# Patient Record
Sex: FEMALE | Race: WHITE | NOT HISPANIC OR LATINO | Employment: FULL TIME | URBAN - METROPOLITAN AREA
[De-identification: names, ages, dates, MRNs, and addresses within clinical notes are randomized per-mention and may not be internally consistent; named-entity substitution may affect disease eponyms.]

---

## 2024-01-10 ENCOUNTER — LAB (OUTPATIENT)
Dept: LAB | Facility: HOSPITAL | Age: 29
End: 2024-01-10
Payer: COMMERCIAL

## 2024-01-10 ENCOUNTER — TRANSCRIBE ORDERS (OUTPATIENT)
Dept: LAB | Facility: HOSPITAL | Age: 29
End: 2024-01-10
Payer: COMMERCIAL

## 2024-01-10 DIAGNOSIS — Z3A.21 21 WEEKS GESTATION OF PREGNANCY: Primary | ICD-10-CM

## 2024-01-10 DIAGNOSIS — Z3A.21 21 WEEKS GESTATION OF PREGNANCY: ICD-10-CM

## 2024-01-10 LAB
BLD GP AB SCN SERPL QL: NEGATIVE
DEPRECATED RDW RBC AUTO: 40.5 FL (ref 37–54)
ERYTHROCYTE [DISTWIDTH] IN BLOOD BY AUTOMATED COUNT: 12.4 % (ref 12.3–15.4)
EXTERNAL CHLAMYDIA SCREEN: NEGATIVE
EXTERNAL GONORRHEA SCREEN: NEGATIVE
EXTERNAL GTT 1 HOUR: 123
EXTERNAL GTT 1 HOUR: 123
EXTERNAL HEPATITIS B SURFACE ANTIGEN: NEGATIVE
EXTERNAL HEPATITIS C AB: NORMAL
EXTERNAL RUBELLA QUALITATIVE: NORMAL
EXTERNAL RUBELLA QUALITATIVE: NORMAL
EXTERNAL SYPHILIS ANTIBODY: NORMAL
EXTERNAL URINE DRUG SCREEN: NORMAL
EXTERNAL URINE DRUG SCREEN: NORMAL
GLUCOSE 1H P 100 G GLC PO SERPL-MCNC: 123 MG/DL (ref 65–139)
HCT VFR BLD AUTO: 35.3 % (ref 34–46.6)
HGB BLD-MCNC: 11.6 G/DL (ref 12–15.9)
HIV1 P24 AG SERPL QL IA: NORMAL
MCH RBC QN AUTO: 29.2 PG (ref 26.6–33)
MCHC RBC AUTO-ENTMCNC: 32.9 G/DL (ref 31.5–35.7)
MCV RBC AUTO: 88.9 FL (ref 79–97)
PLATELET # BLD AUTO: 259 10*3/MM3 (ref 140–450)
PMV BLD AUTO: 11.7 FL (ref 6–12)
RBC # BLD AUTO: 3.97 10*6/MM3 (ref 3.77–5.28)
WBC NRBC COR # BLD AUTO: 13.81 10*3/MM3 (ref 3.4–10.8)

## 2024-01-10 PROCEDURE — 85027 COMPLETE CBC AUTOMATED: CPT

## 2024-01-10 PROCEDURE — 36415 COLL VENOUS BLD VENIPUNCTURE: CPT

## 2024-01-10 PROCEDURE — 86850 RBC ANTIBODY SCREEN: CPT

## 2024-01-10 PROCEDURE — 82948 REAGENT STRIP/BLOOD GLUCOSE: CPT | Performed by: OBSTETRICS & GYNECOLOGY

## 2024-01-10 PROCEDURE — 82950 GLUCOSE TEST: CPT

## 2024-03-30 LAB — EXTERNAL GROUP B STREP ANTIGEN: NEGATIVE

## 2024-04-09 ENCOUNTER — HOSPITAL ENCOUNTER (INPATIENT)
Facility: HOSPITAL | Age: 29
LOS: 3 days | Discharge: HOME | End: 2024-04-12
Attending: OBSTETRICS & GYNECOLOGY | Admitting: OBSTETRICS & GYNECOLOGY
Payer: COMMERCIAL

## 2024-04-09 ENCOUNTER — ANESTHESIA (OUTPATIENT)
Dept: LABOR AND DELIVERY | Facility: HOSPITAL | Age: 29
End: 2024-04-09
Payer: COMMERCIAL

## 2024-04-09 ENCOUNTER — ANESTHESIA EVENT (OUTPATIENT)
Dept: LABOR AND DELIVERY | Facility: HOSPITAL | Age: 29
End: 2024-04-09
Payer: COMMERCIAL

## 2024-04-09 PROBLEM — Z37.9 NORMAL LABOR: Status: ACTIVE | Noted: 2024-04-09

## 2024-04-09 LAB
ABO GROUP BLD: NORMAL
ALP SERPL-CCNC: 149 U/L (ref 39–117)
ALT SERPL W P-5'-P-CCNC: 7 U/L (ref 1–33)
AST SERPL-CCNC: 23 U/L (ref 1–32)
BILIRUB SERPL-MCNC: 0.4 MG/DL (ref 0–1.2)
BLD GP AB SCN SERPL QL: NEGATIVE
CREAT SERPL-MCNC: 0.67 MG/DL (ref 0.57–1)
DEPRECATED RDW RBC AUTO: 43.6 FL (ref 37–54)
ERYTHROCYTE [DISTWIDTH] IN BLOOD BY AUTOMATED COUNT: 13.7 % (ref 12.3–15.4)
HCT VFR BLD AUTO: 37.2 % (ref 34–46.6)
HGB BLD-MCNC: 12.8 G/DL (ref 12–15.9)
LDH SERPL-CCNC: 184 U/L (ref 135–214)
MCH RBC QN AUTO: 29.6 PG (ref 26.6–33)
MCHC RBC AUTO-ENTMCNC: 34.4 G/DL (ref 31.5–35.7)
MCV RBC AUTO: 86.1 FL (ref 79–97)
PLATELET # BLD AUTO: 252 10*3/MM3 (ref 140–450)
PMV BLD AUTO: 11.8 FL (ref 6–12)
RBC # BLD AUTO: 4.32 10*6/MM3 (ref 3.77–5.28)
RH BLD: POSITIVE
T PALLIDUM IGG SER QL: NORMAL
T&S EXPIRATION DATE: NORMAL
URATE SERPL-MCNC: 5 MG/DL (ref 2.4–5.7)
WBC NRBC COR # BLD AUTO: 15 10*3/MM3 (ref 3.4–10.8)

## 2024-04-09 PROCEDURE — 86780 TREPONEMA PALLIDUM: CPT | Performed by: OBSTETRICS & GYNECOLOGY

## 2024-04-09 PROCEDURE — 84460 ALANINE AMINO (ALT) (SGPT): CPT | Performed by: OBSTETRICS & GYNECOLOGY

## 2024-04-09 PROCEDURE — 84075 ASSAY ALKALINE PHOSPHATASE: CPT | Performed by: OBSTETRICS & GYNECOLOGY

## 2024-04-09 PROCEDURE — 59025 FETAL NON-STRESS TEST: CPT

## 2024-04-09 PROCEDURE — 25010000002 FENTANYL CITRATE (PF) 50 MCG/ML SOLUTION: Performed by: ANESTHESIOLOGY

## 2024-04-09 PROCEDURE — 25010000002 ROPIVACAINE PER 1 MG: Performed by: ANESTHESIOLOGY

## 2024-04-09 PROCEDURE — 86901 BLOOD TYPING SEROLOGIC RH(D): CPT | Performed by: OBSTETRICS & GYNECOLOGY

## 2024-04-09 PROCEDURE — C1755 CATHETER, INTRASPINAL: HCPCS | Performed by: ANESTHESIOLOGY

## 2024-04-09 PROCEDURE — 25810000003 LACTATED RINGERS PER 1000 ML: Performed by: OBSTETRICS & GYNECOLOGY

## 2024-04-09 PROCEDURE — 82565 ASSAY OF CREATININE: CPT | Performed by: OBSTETRICS & GYNECOLOGY

## 2024-04-09 PROCEDURE — 25010000002 ONDANSETRON PER 1 MG: Performed by: OBSTETRICS & GYNECOLOGY

## 2024-04-09 PROCEDURE — 25010000002 BUPIVACAINE (PF) 0.5 % SOLUTION: Performed by: ANESTHESIOLOGY

## 2024-04-09 PROCEDURE — C1755 CATHETER, INTRASPINAL: HCPCS

## 2024-04-09 PROCEDURE — 84450 TRANSFERASE (AST) (SGOT): CPT | Performed by: OBSTETRICS & GYNECOLOGY

## 2024-04-09 PROCEDURE — 85027 COMPLETE CBC AUTOMATED: CPT | Performed by: OBSTETRICS & GYNECOLOGY

## 2024-04-09 PROCEDURE — 25810000003 LACTATED RINGERS SOLUTION: Performed by: OBSTETRICS & GYNECOLOGY

## 2024-04-09 PROCEDURE — 86850 RBC ANTIBODY SCREEN: CPT | Performed by: OBSTETRICS & GYNECOLOGY

## 2024-04-09 PROCEDURE — 83615 LACTATE (LD) (LDH) ENZYME: CPT | Performed by: OBSTETRICS & GYNECOLOGY

## 2024-04-09 PROCEDURE — 82247 BILIRUBIN TOTAL: CPT | Performed by: OBSTETRICS & GYNECOLOGY

## 2024-04-09 PROCEDURE — 51702 INSERT TEMP BLADDER CATH: CPT

## 2024-04-09 PROCEDURE — 84550 ASSAY OF BLOOD/URIC ACID: CPT | Performed by: OBSTETRICS & GYNECOLOGY

## 2024-04-09 PROCEDURE — 86900 BLOOD TYPING SEROLOGIC ABO: CPT | Performed by: OBSTETRICS & GYNECOLOGY

## 2024-04-09 RX ORDER — SODIUM CHLORIDE, SODIUM LACTATE, POTASSIUM CHLORIDE, CALCIUM CHLORIDE 600; 310; 30; 20 MG/100ML; MG/100ML; MG/100ML; MG/100ML
125 INJECTION, SOLUTION INTRAVENOUS CONTINUOUS
Status: DISCONTINUED | OUTPATIENT
Start: 2024-04-09 | End: 2024-04-12 | Stop reason: HOSPADM

## 2024-04-09 RX ORDER — ACETAMINOPHEN 325 MG/1
650 TABLET ORAL EVERY 4 HOURS PRN
Status: DISCONTINUED | OUTPATIENT
Start: 2024-04-09 | End: 2024-04-12 | Stop reason: HOSPADM

## 2024-04-09 RX ORDER — OXYTOCIN/0.9 % SODIUM CHLORIDE 30/500 ML
999 PLASTIC BAG, INJECTION (ML) INTRAVENOUS ONCE
Status: DISCONTINUED | OUTPATIENT
Start: 2024-04-09 | End: 2024-04-12 | Stop reason: HOSPADM

## 2024-04-09 RX ORDER — PRENATAL WITH FERROUS FUM AND FOLIC ACID 3080; 920; 120; 400; 22; 1.84; 3; 20; 10; 1; 12; 200; 27; 25; 2 [IU]/1; [IU]/1; MG/1; [IU]/1; MG/1; MG/1; MG/1; MG/1; MG/1; MG/1; UG/1; MG/1; MG/1; MG/1; MG/1
TABLET ORAL DAILY
COMMUNITY

## 2024-04-09 RX ORDER — SODIUM CHLORIDE 9 MG/ML
40 INJECTION, SOLUTION INTRAVENOUS AS NEEDED
Status: DISCONTINUED | OUTPATIENT
Start: 2024-04-09 | End: 2024-04-12 | Stop reason: HOSPADM

## 2024-04-09 RX ORDER — L.ACID,CASEI/B.ANIMAL/S.THERMO 16B CELL
1 CAPSULE ORAL DAILY
COMMUNITY

## 2024-04-09 RX ORDER — LIDOCAINE HYDROCHLORIDE 10 MG/ML
0.5 INJECTION, SOLUTION EPIDURAL; INFILTRATION; INTRACAUDAL; PERINEURAL ONCE AS NEEDED
Status: DISCONTINUED | OUTPATIENT
Start: 2024-04-09 | End: 2024-04-12 | Stop reason: HOSPADM

## 2024-04-09 RX ORDER — SODIUM CHLORIDE 0.9 % (FLUSH) 0.9 %
10 SYRINGE (ML) INJECTION AS NEEDED
Status: DISCONTINUED | OUTPATIENT
Start: 2024-04-09 | End: 2024-04-12 | Stop reason: HOSPADM

## 2024-04-09 RX ORDER — ROPIVACAINE HYDROCHLORIDE 2 MG/ML
15 INJECTION, SOLUTION EPIDURAL; INFILTRATION; PERINEURAL CONTINUOUS
Status: DISCONTINUED | OUTPATIENT
Start: 2024-04-10 | End: 2024-04-12 | Stop reason: HOSPADM

## 2024-04-09 RX ORDER — ONDANSETRON 2 MG/ML
4 INJECTION INTRAMUSCULAR; INTRAVENOUS ONCE AS NEEDED
Status: DISCONTINUED | OUTPATIENT
Start: 2024-04-09 | End: 2024-04-10

## 2024-04-09 RX ORDER — EPHEDRINE SULFATE 5 MG/ML
10 INJECTION INTRAVENOUS
Status: DISCONTINUED | OUTPATIENT
Start: 2024-04-09 | End: 2024-04-10 | Stop reason: HOSPADM

## 2024-04-09 RX ORDER — CITRIC ACID/SODIUM CITRATE 334-500MG
30 SOLUTION, ORAL ORAL ONCE
Status: DISCONTINUED | OUTPATIENT
Start: 2024-04-10 | End: 2024-04-10 | Stop reason: HOSPADM

## 2024-04-09 RX ORDER — METOCLOPRAMIDE HYDROCHLORIDE 5 MG/ML
10 INJECTION INTRAMUSCULAR; INTRAVENOUS ONCE AS NEEDED
Status: DISCONTINUED | OUTPATIENT
Start: 2024-04-09 | End: 2024-04-10 | Stop reason: HOSPADM

## 2024-04-09 RX ORDER — OXYTOCIN/0.9 % SODIUM CHLORIDE 30/500 ML
250 PLASTIC BAG, INJECTION (ML) INTRAVENOUS CONTINUOUS
Status: ACTIVE | OUTPATIENT
Start: 2024-04-09 | End: 2024-04-09

## 2024-04-09 RX ORDER — CARBOPROST TROMETHAMINE 250 UG/ML
250 INJECTION, SOLUTION INTRAMUSCULAR
Status: DISCONTINUED | OUTPATIENT
Start: 2024-04-09 | End: 2024-04-12 | Stop reason: HOSPADM

## 2024-04-09 RX ORDER — ONDANSETRON 2 MG/ML
4 INJECTION INTRAMUSCULAR; INTRAVENOUS EVERY 6 HOURS PRN
Status: DISCONTINUED | OUTPATIENT
Start: 2024-04-09 | End: 2024-04-10

## 2024-04-09 RX ORDER — DIPHENHYDRAMINE HYDROCHLORIDE 50 MG/ML
12.5 INJECTION INTRAMUSCULAR; INTRAVENOUS EVERY 8 HOURS PRN
Status: DISCONTINUED | OUTPATIENT
Start: 2024-04-09 | End: 2024-04-10 | Stop reason: HOSPADM

## 2024-04-09 RX ORDER — SODIUM CHLORIDE 0.9 % (FLUSH) 0.9 %
10 SYRINGE (ML) INJECTION EVERY 12 HOURS SCHEDULED
Status: DISCONTINUED | OUTPATIENT
Start: 2024-04-09 | End: 2024-04-12 | Stop reason: HOSPADM

## 2024-04-09 RX ORDER — METHYLERGONOVINE MALEATE 0.2 MG/ML
200 INJECTION INTRAVENOUS ONCE AS NEEDED
Status: DISCONTINUED | OUTPATIENT
Start: 2024-04-09 | End: 2024-04-12 | Stop reason: HOSPADM

## 2024-04-09 RX ORDER — MAGNESIUM CARB/ALUMINUM HYDROX 105-160MG
30 TABLET,CHEWABLE ORAL ONCE
Status: DISCONTINUED | OUTPATIENT
Start: 2024-04-09 | End: 2024-04-12 | Stop reason: HOSPADM

## 2024-04-09 RX ORDER — MISOPROSTOL 200 UG/1
800 TABLET ORAL ONCE AS NEEDED
Status: DISCONTINUED | OUTPATIENT
Start: 2024-04-09 | End: 2024-04-12 | Stop reason: HOSPADM

## 2024-04-09 RX ORDER — ONDANSETRON 4 MG/1
4 TABLET, ORALLY DISINTEGRATING ORAL EVERY 6 HOURS PRN
Status: DISCONTINUED | OUTPATIENT
Start: 2024-04-09 | End: 2024-04-10

## 2024-04-09 RX ORDER — FAMOTIDINE 10 MG/ML
20 INJECTION, SOLUTION INTRAVENOUS ONCE AS NEEDED
Status: DISCONTINUED | OUTPATIENT
Start: 2024-04-09 | End: 2024-04-10 | Stop reason: HOSPADM

## 2024-04-09 RX ADMIN — EPHEDRINE SULFATE 10 MG: 5 INJECTION INTRAVENOUS at 23:54

## 2024-04-09 RX ADMIN — ONDANSETRON 4 MG: 2 INJECTION INTRAMUSCULAR; INTRAVENOUS at 21:39

## 2024-04-09 RX ADMIN — SODIUM CHLORIDE, POTASSIUM CHLORIDE, SODIUM LACTATE AND CALCIUM CHLORIDE 125 ML/HR: 600; 310; 30; 20 INJECTION, SOLUTION INTRAVENOUS at 11:15

## 2024-04-09 RX ADMIN — SODIUM CHLORIDE, POTASSIUM CHLORIDE, SODIUM LACTATE AND CALCIUM CHLORIDE 1000 ML: 600; 310; 30; 20 INJECTION, SOLUTION INTRAVENOUS at 22:52

## 2024-04-09 RX ADMIN — FENTANYL CITRATE 100 MCG: 50 INJECTION, SOLUTION INTRAMUSCULAR; INTRAVENOUS at 23:24

## 2024-04-09 RX ADMIN — BUPIVACAINE HYDROCHLORIDE 6 ML: 5 INJECTION, SOLUTION EPIDURAL; INTRACAUDAL at 23:24

## 2024-04-09 RX ADMIN — SODIUM CHLORIDE, POTASSIUM CHLORIDE, SODIUM LACTATE AND CALCIUM CHLORIDE 125 ML/HR: 600; 310; 30; 20 INJECTION, SOLUTION INTRAVENOUS at 15:30

## 2024-04-09 RX ADMIN — ROPIVACAINE HYDROCHLORIDE 15 ML/HR: 2 INJECTION, SOLUTION EPIDURAL; INFILTRATION at 23:24

## 2024-04-09 RX ADMIN — Medication 10 ML: at 21:39

## 2024-04-09 RX ADMIN — LIDOCAINE HYDROCHLORIDE AND EPINEPHRINE 3 ML: 15; 5 INJECTION, SOLUTION EPIDURAL at 23:24

## 2024-04-09 NOTE — H&P
JACQUE Reyes  Obstetric History and Physical    CC: labor    SUBJECTIVE:     Patient is a 28 y.o. female  currently at 38w4d, who presents with uncomplicated pregnancy with c/o ctx beginning early this am. + FM no VB or LOF.     5 cm on admission. Getting more uncomfortable. Desires AROM    Prenatal Information:  Prenatal Results       Initial Prenatal Labs       Test Value Reference Range Date Time    Hemoglobin  13.3 g/dL 12.0 - 15.9 10/12/23 1431    Hematocrit  38.1 % 34.0 - 46.6 10/12/23 1431    Platelets  321 10*3/mm3 140 - 450 10/12/23 1431    Rubella IgG ^ Immune   01/10/24       ^ Immune   01/10/24        1.48 index Immune >0.99 10/12/23 1431    Hepatitis B SAg ^ Negative   01/10/24        Non-Reactive  Non-Reactive 10/12/23 1431    Hepatitis C Ab ^ non-reactive   01/10/24        Non-Reactive  Non-Reactive 10/12/23 1431    RPR        T. Pallidum Ab   Non-Reactive  Non-Reactive 24 1126    ABO  O   24 1126    Rh  Positive   24 1126    Antibody Screen  Negative   10/12/23 1431    HIV ^ Non-Reactive   01/10/24        Non-Reactive  Non-Reactive 10/12/23 1431    Urine Culture  No growth   10/12/23 1431    Gonorrhea ^ Negative   01/10/24        Negative  Negative 10/12/23 1431    Chlamydia ^ Negative   01/10/24        Negative  Negative 10/12/23 1431    TSH        HgB A1c         Varicella IgG        HgB Electrophoresis         Cystic fibrosis                   Fetal testing        Test Value Reference Range Date Time    NIPT        MSAFP        AFP-4                  2nd and 3rd Trimester       Test Value Reference Range Date Time    Hemoglobin (repeated)  12.8 g/dL 12.0 - 15.9 24 1126       11.6 g/dL 12.0 - 15.9 01/10/24 1027    Hematocrit (repeated)  37.2 % 34.0 - 46.6 24 1126       35.3 % 34.0 - 46.6 01/10/24 1027    Platelets   252 10*3/mm3 140 - 450 24 1126       259 10*3/mm3 140 - 450 01/10/24 1027       321 10*3/mm3 140 - 450 10/12/23 1431    GCT  123 mg/dL 65 - 139  01/10/24 1027    Antibody Screen (repeated)  Negative   04/09/24 1126       Negative   01/10/24 1027    3rd TM syphilis scrn (repeated)  RPR         3rd TM syphilis scrn (repeated) FTA ^ non-reactive   01/10/24     GTT Fasting        GTT 1 Hr ^ 123   01/10/24       ^ 123   01/10/24     GTT 2 Hr        GTT 3 Hr        Group B Strep ^ Negative   03/30/24               Other testing        Test Value Reference Range Date Time    Parvo IgG         CMV IgG                   Drug Screening       Test Value Reference Range Date Time    Amphetamine Screen  Negative  Negative 10/12/23 1431    Barbiturate Screen  Negative  Negative 10/12/23 1431    Benzodiazepine Screen  Negative  Negative 10/12/23 1431    Methadone Screen  Negative  Negative 10/12/23 1431    Phencyclidine Screen  Negative  Negative 10/12/23 1431    Opiates Screen  Negative  Negative 10/12/23 1431    THC Screen  Negative  Negative 10/12/23 1431    Cocaine Screen  Negative  Negative 10/12/23 1431    Propoxyphene Screen  Negative  Negative 10/12/23 1431    Buprenorphine Screen  Negative  Negative 10/12/23 1431    Methamphetamine Screen  Negative  Negative 10/12/23 1431    Oxycodone Screen  Negative  Negative 10/12/23 1431    Tricyclic Antidepressants Screen  Negative  Negative 10/12/23 1431              Legend    ^: Historical                          External Prenatal Results       Pregnancy Outside Results - Transcribed From Office Records - See Scanned Records For Details       Test Value Date Time    ABO  O  04/09/24 1126    Rh  Positive  04/09/24 1126    Antibody Screen  Negative  04/09/24 1126       Negative  01/10/24 1027       Negative  10/12/23 1431    Varicella IgG       Rubella ^ Immune  01/10/24       ^ Immune  01/10/24        1.48 index 10/12/23 1431    Hgb  12.8 g/dL 04/09/24 1126       11.6 g/dL 01/10/24 1027       13.3 g/dL 10/12/23 1431    Hct  37.2 % 04/09/24 1126       35.3 % 01/10/24 1027       38.1 % 10/12/23 1431    Glucose Fasting GTT        Glucose Tolerance Test 1 hour ^ 123  01/10/24       ^ 123  01/10/24     Glucose Tolerance Test 3 hour       Gonorrhea (discrete) ^ Negative  01/10/24        Negative  10/12/23 1431    Chlamydia (discrete) ^ Negative  01/10/24        Negative  10/12/23 1431    RPR       VDRL       Syphilis Antibody ^ non-reactive  01/10/24     HBsAg ^ Negative  01/10/24        Non-Reactive  10/12/23 1431    Herpes Simplex Virus PCR       Herpes Simplex VIrus Culture       HIV ^ Non-Reactive  01/10/24        Non-Reactive  10/12/23 1431    Hep C RNA Quant PCR       Hep C Antibody ^ non-reactive  01/10/24        Non-Reactive  10/12/23 1431    AFP       Group B Strep ^ Negative  24     GBS Susceptibility to Clindamycin       GBS Susceptibility to Erythromycin       Fetal Fibronectin       Genetic Testing, Maternal Blood                 Drug Screening       Test Value Date Time    Urine Drug Screen ^ neg  01/10/24       ^ neg  01/10/24     Amphetamine Screen  Negative  10/12/23 1431    Barbiturate Screen  Negative  10/12/23 1431    Benzodiazepine Screen  Negative  10/12/23 1431    Methadone Screen  Negative  10/12/23 1431    Phencyclidine Screen  Negative  10/12/23 1431    Opiates Screen  Negative  10/12/23 1431    THC Screen  Negative  10/12/23 1431    Cocaine Screen       Propoxyphene Screen  Negative  10/12/23 1431    Buprenorphine Screen  Negative  10/12/23 1431    Methamphetamine Screen       Oxycodone Screen  Negative  10/12/23 1431    Tricyclic Antidepressants Screen  Negative  10/12/23 1431              Legend    ^: Historical                             OB History:                     OB History    Para Term  AB Living   1 0 0 0 0 0   SAB IAB Ectopic Molar Multiple Live Births   0 0 0 0 0 0      # Outcome Date GA Lbr Baltazar/2nd Weight Sex Type Anes PTL Lv   1 Current               Allergies:                      No Known Allergies   Past Medical History: Past Medical History:   Diagnosis Date    Female  infertility       Past Surgical History Past Surgical History:   Procedure Laterality Date    DEEP NECK LYMPH NODE BIOPSY / EXCISION Left 2005    HERNIA REPAIR  2010    MANDIBLE FRACTURE SURGERY  2013    WISDOM TOOTH EXTRACTION        Family History: Family History   Problem Relation Age of Onset    No Known Problems Mother     Heart disease Father     Hypertension Father       Social History:  reports that she has never smoked. She does not have any smokeless tobacco history on file.   reports no history of alcohol use.   reports no history of drug use.    General ROS: Pertinent items are noted in HPI, all other systems reviewed and negative   Medications: Prenatal 27-1, Risaquad-2, and metFORMIN       Objective       Vital Signs Range for the last 24 hours  Temperature: Temp:  [97.8 °F (36.6 °C)-98.5 °F (36.9 °C)] 98.5 °F (36.9 °C)   BP: BP: (114-122)/(70-89) 114/70   Pulse: Heart Rate:  [] 106   Respirations: Resp:  [16-18] 16   SPO2:                 Physical Examination: General appearance - alert, well appearing, and in no distress  Chest - clear to auscultation, no wheezes, rales or rhonchi, symmetric air entry  Heart - normal rate, regular rhythm, normal S1, S2, no murmurs, rubs, clicks or gallops  Abdomen - Soft, gravid, nontender  Extremities - pedal edema tr +      Presentation: VTX   Cervix: Exam by: Method: sterile exam per physician   Dilation: Cervical Dilation (cm): 7   Effacement: Cervical Effacement: 90%   Station:  -1    AROM clear fluid       Fetal Heart Rate Assessment           Baseline: Fetal HR Baseline: normal range   Variability: Fetal HR Variability: moderate (amplitude range 6 to 25 bpm)   Accels: Fetal HR Accelerations: greater than/equal to 15 bpm, lasting at least 15 seconds   Decels: Fetal HR Decelerations: absent   Tracing Category:       Uterine Assessment   Method: Method: external tocotransducer   Frequency (min): Contraction Frequency (Minutes): x1 ctx traced (difficult  tracing)   Ctx Count in 10 min:       Laboratory Results:  WBC   Date Value Ref Range Status   04/09/2024 15.00 (H) 3.40 - 10.80 10*3/mm3 Final     RBC   Date Value Ref Range Status   04/09/2024 4.32 3.77 - 5.28 10*6/mm3 Final     Hemoglobin   Date Value Ref Range Status   04/09/2024 12.8 12.0 - 15.9 g/dL Final     Hematocrit   Date Value Ref Range Status   04/09/2024 37.2 34.0 - 46.6 % Final     MCV   Date Value Ref Range Status   04/09/2024 86.1 79.0 - 97.0 fL Final     MCH   Date Value Ref Range Status   04/09/2024 29.6 26.6 - 33.0 pg Final     MCHC   Date Value Ref Range Status   04/09/2024 34.4 31.5 - 35.7 g/dL Final     RDW   Date Value Ref Range Status   04/09/2024 13.7 12.3 - 15.4 % Final     RDW-SD   Date Value Ref Range Status   04/09/2024 43.6 37.0 - 54.0 fl Final     MPV   Date Value Ref Range Status   04/09/2024 11.8 6.0 - 12.0 fL Final     Platelets   Date Value Ref Range Status   04/09/2024 252 140 - 450 10*3/mm3 Final             Assessment/Plan:   IUP 38w4d in labor    1.Admitted to LD, plan expectant mgmt  2.GBS neg  3.FWB reassuring      Citlali Miller MD  4/9/2024  16:53 EDT

## 2024-04-10 PROBLEM — Z37.9 NORMAL LABOR: Status: RESOLVED | Noted: 2024-04-09 | Resolved: 2024-04-10

## 2024-04-10 LAB
ATMOSPHERIC PRESS: ABNORMAL MM[HG]
ATMOSPHERIC PRESS: ABNORMAL MM[HG]
BASE EXCESS BLDCOA CALC-SCNC: -8.1 MMOL/L (ref 0–2)
BASE EXCESS BLDCOV CALC-SCNC: -1.6 MMOL/L (ref 0–2)
BDY SITE: ABNORMAL
BDY SITE: ABNORMAL
BODY TEMPERATURE: 37
BODY TEMPERATURE: 37
CO2 BLDA-SCNC: 26.5 MMOL/L (ref 22–33)
CO2 BLDA-SCNC: 26.8 MMOL/L (ref 22–33)
EPAP: 0
EPAP: 0
HCO3 BLDCOA-SCNC: 24.4 MMOL/L (ref 16.9–20.5)
HCO3 BLDCOV-SCNC: 25.1 MMOL/L (ref 18.6–21.4)
HGB BLDA-MCNC: 19.3 G/DL (ref 14–18)
HGB BLDA-MCNC: 19.8 G/DL (ref 14–18)
INHALED O2 CONCENTRATION: 21 %
INHALED O2 CONCENTRATION: 21 %
IPAP: 0
IPAP: 0
Lab: ABNORMAL
MODALITY: ABNORMAL
MODALITY: ABNORMAL
NOTE: 0
NOTIFIED BY: ABNORMAL
NOTIFIED WHO: ABNORMAL
PAW @ PEAK INSP FLOW SETTING VENT: 0 CMH2O
PAW @ PEAK INSP FLOW SETTING VENT: 0 CMH2O
PCO2 BLDCOA: 79.4 MMHG (ref 43.3–54.9)
PCO2 BLDCOV: 47.6 MM HG (ref 28–40)
PH BLDCOA: 7.1 PH UNITS (ref 7.22–7.3)
PH BLDCOV: 7.33 PH UNITS (ref 7.31–7.37)
PO2 BLDCOA: ABNORMAL MM[HG]
PO2 BLDCOV: 22.5 MM HG (ref 21–31)
SAO2 % BLDCOA: ABNORMAL %
SAO2 % BLDCOA: ABNORMAL %
SAO2 % BLDCOV: 52.4 %
TOTAL RATE: 0 BREATHS/MINUTE
TOTAL RATE: 0 BREATHS/MINUTE
VENTILATOR MODE: ABNORMAL

## 2024-04-10 PROCEDURE — 25810000003 LACTATED RINGERS SOLUTION: Performed by: ANESTHESIOLOGY

## 2024-04-10 PROCEDURE — 25010000002 ONDANSETRON PER 1 MG: Performed by: OBSTETRICS & GYNECOLOGY

## 2024-04-10 PROCEDURE — 82805 BLOOD GASES W/O2 SATURATION: CPT

## 2024-04-10 PROCEDURE — 94799 UNLISTED PULMONARY SVC/PX: CPT

## 2024-04-10 PROCEDURE — 25010000002 GENTAMICIN PER 80 MG: Performed by: OBSTETRICS & GYNECOLOGY

## 2024-04-10 PROCEDURE — 25810000003 LACTATED RINGERS PER 1000 ML: Performed by: OBSTETRICS & GYNECOLOGY

## 2024-04-10 PROCEDURE — 0HQ9XZZ REPAIR PERINEUM SKIN, EXTERNAL APPROACH: ICD-10-PCS | Performed by: OBSTETRICS & GYNECOLOGY

## 2024-04-10 PROCEDURE — 25010000002 AMPICILLIN PER 500 MG: Performed by: ADVANCED PRACTICE MIDWIFE

## 2024-04-10 RX ORDER — ACETAMINOPHEN 325 MG/1
650 TABLET ORAL EVERY 6 HOURS PRN
Status: DISCONTINUED | OUTPATIENT
Start: 2024-04-10 | End: 2024-04-12 | Stop reason: HOSPADM

## 2024-04-10 RX ORDER — SODIUM CHLORIDE 0.9 % (FLUSH) 0.9 %
1-10 SYRINGE (ML) INJECTION AS NEEDED
Status: DISCONTINUED | OUTPATIENT
Start: 2024-04-10 | End: 2024-04-12 | Stop reason: HOSPADM

## 2024-04-10 RX ORDER — OXYMETAZOLINE HYDROCHLORIDE 0.05 G/100ML
2 SPRAY NASAL 2 TIMES DAILY
Status: DISCONTINUED | OUTPATIENT
Start: 2024-04-10 | End: 2024-04-10

## 2024-04-10 RX ORDER — HYDROCODONE BITARTRATE AND ACETAMINOPHEN 5; 325 MG/1; MG/1
1 TABLET ORAL EVERY 4 HOURS PRN
Status: DISCONTINUED | OUTPATIENT
Start: 2024-04-10 | End: 2024-04-12 | Stop reason: HOSPADM

## 2024-04-10 RX ORDER — HYDROCODONE BITARTRATE AND ACETAMINOPHEN 10; 325 MG/1; MG/1
1 TABLET ORAL EVERY 4 HOURS PRN
Status: DISCONTINUED | OUTPATIENT
Start: 2024-04-10 | End: 2024-04-12 | Stop reason: HOSPADM

## 2024-04-10 RX ORDER — ONDANSETRON 2 MG/ML
4 INJECTION INTRAMUSCULAR; INTRAVENOUS EVERY 6 HOURS PRN
Status: DISCONTINUED | OUTPATIENT
Start: 2024-04-10 | End: 2024-04-12 | Stop reason: HOSPADM

## 2024-04-10 RX ORDER — ACETAMINOPHEN 500 MG
1000 TABLET ORAL ONCE
Status: COMPLETED | OUTPATIENT
Start: 2024-04-10 | End: 2024-04-10

## 2024-04-10 RX ORDER — IBUPROFEN 600 MG/1
600 TABLET ORAL EVERY 6 HOURS PRN
Status: DISCONTINUED | OUTPATIENT
Start: 2024-04-10 | End: 2024-04-12 | Stop reason: HOSPADM

## 2024-04-10 RX ORDER — IBUPROFEN 600 MG/1
600 TABLET ORAL EVERY 8 HOURS SCHEDULED
Status: DISCONTINUED | OUTPATIENT
Start: 2024-04-10 | End: 2024-04-10

## 2024-04-10 RX ORDER — OXYTOCIN/0.9 % SODIUM CHLORIDE 30/500 ML
125 PLASTIC BAG, INJECTION (ML) INTRAVENOUS ONCE AS NEEDED
Status: DISCONTINUED | OUTPATIENT
Start: 2024-04-10 | End: 2024-04-12 | Stop reason: HOSPADM

## 2024-04-10 RX ORDER — FENTANYL CITRATE 50 UG/ML
INJECTION, SOLUTION INTRAMUSCULAR; INTRAVENOUS AS NEEDED
Status: DISCONTINUED | OUTPATIENT
Start: 2024-04-09 | End: 2024-04-10 | Stop reason: SURG

## 2024-04-10 RX ORDER — DOCUSATE SODIUM 100 MG/1
100 CAPSULE, LIQUID FILLED ORAL 2 TIMES DAILY
Status: DISCONTINUED | OUTPATIENT
Start: 2024-04-10 | End: 2024-04-12 | Stop reason: HOSPADM

## 2024-04-10 RX ORDER — OXYTOCIN/0.9 % SODIUM CHLORIDE 30/500 ML
2-20 PLASTIC BAG, INJECTION (ML) INTRAVENOUS
Status: DISPENSED | OUTPATIENT
Start: 2024-04-10 | End: 2024-04-10

## 2024-04-10 RX ORDER — LIDOCAINE HYDROCHLORIDE AND EPINEPHRINE 15; 5 MG/ML; UG/ML
INJECTION, SOLUTION EPIDURAL AS NEEDED
Status: DISCONTINUED | OUTPATIENT
Start: 2024-04-09 | End: 2024-04-10 | Stop reason: SURG

## 2024-04-10 RX ORDER — BISACODYL 10 MG
10 SUPPOSITORY, RECTAL RECTAL DAILY PRN
Status: DISCONTINUED | OUTPATIENT
Start: 2024-04-11 | End: 2024-04-12 | Stop reason: HOSPADM

## 2024-04-10 RX ORDER — HYDROCORTISONE 25 MG/G
1 CREAM TOPICAL AS NEEDED
Status: DISCONTINUED | OUTPATIENT
Start: 2024-04-10 | End: 2024-04-12 | Stop reason: HOSPADM

## 2024-04-10 RX ORDER — OXYMETAZOLINE HYDROCHLORIDE 0.05 G/100ML
2 SPRAY NASAL 2 TIMES DAILY
Status: DISCONTINUED | OUTPATIENT
Start: 2024-04-10 | End: 2024-04-12 | Stop reason: HOSPADM

## 2024-04-10 RX ORDER — BUPIVACAINE HYDROCHLORIDE 5 MG/ML
INJECTION, SOLUTION EPIDURAL; INTRACAUDAL AS NEEDED
Status: DISCONTINUED | OUTPATIENT
Start: 2024-04-09 | End: 2024-04-10 | Stop reason: SURG

## 2024-04-10 RX ADMIN — Medication 2 MILLI-UNITS/MIN: at 02:32

## 2024-04-10 RX ADMIN — Medication 2 SPRAY: at 02:28

## 2024-04-10 RX ADMIN — IBUPROFEN 600 MG: 600 TABLET, FILM COATED ORAL at 08:46

## 2024-04-10 RX ADMIN — ONDANSETRON 4 MG: 2 INJECTION INTRAMUSCULAR; INTRAVENOUS at 04:32

## 2024-04-10 RX ADMIN — EPHEDRINE SULFATE 10 MG: 5 INJECTION INTRAVENOUS at 00:07

## 2024-04-10 RX ADMIN — Medication 2 SPRAY: at 21:02

## 2024-04-10 RX ADMIN — SODIUM CHLORIDE, POTASSIUM CHLORIDE, SODIUM LACTATE AND CALCIUM CHLORIDE 999 ML/HR: 600; 310; 30; 20 INJECTION, SOLUTION INTRAVENOUS at 01:34

## 2024-04-10 RX ADMIN — EPHEDRINE SULFATE 10 MG: 5 INJECTION INTRAVENOUS at 00:28

## 2024-04-10 RX ADMIN — ACETAMINOPHEN 1000 MG: 500 TABLET ORAL at 06:22

## 2024-04-10 RX ADMIN — EPHEDRINE SULFATE 10 MG: 5 INJECTION INTRAVENOUS at 01:36

## 2024-04-10 RX ADMIN — AMPICILLIN SODIUM 2 G: 2 INJECTION, POWDER, FOR SOLUTION INTRAMUSCULAR; INTRAVENOUS at 06:25

## 2024-04-10 RX ADMIN — IBUPROFEN 600 MG: 600 TABLET, FILM COATED ORAL at 23:40

## 2024-04-10 RX ADMIN — SODIUM CHLORIDE, POTASSIUM CHLORIDE, SODIUM LACTATE AND CALCIUM CHLORIDE 1000 ML: 600; 310; 30; 20 INJECTION, SOLUTION INTRAVENOUS at 00:22

## 2024-04-10 RX ADMIN — DOCUSATE SODIUM 100 MG: 100 CAPSULE, LIQUID FILLED ORAL at 21:00

## 2024-04-10 RX ADMIN — GENTAMICIN SULFATE 370 MG: 40 INJECTION, SOLUTION INTRAMUSCULAR; INTRAVENOUS at 09:17

## 2024-04-10 RX ADMIN — SODIUM CHLORIDE, POTASSIUM CHLORIDE, SODIUM LACTATE AND CALCIUM CHLORIDE 125 ML/HR: 600; 310; 30; 20 INJECTION, SOLUTION INTRAVENOUS at 09:17

## 2024-04-10 RX ADMIN — Medication 10 ML: at 21:02

## 2024-04-10 RX ADMIN — METFORMIN HYDROCHLORIDE 500 MG: 500 TABLET, FILM COATED ORAL at 21:01

## 2024-04-10 RX ADMIN — SODIUM CHLORIDE, POTASSIUM CHLORIDE, SODIUM LACTATE AND CALCIUM CHLORIDE 125 ML/HR: 600; 310; 30; 20 INJECTION, SOLUTION INTRAVENOUS at 02:30

## 2024-04-10 NOTE — ANESTHESIA PROCEDURE NOTES
Labor Epidural      Patient reassessed immediately prior to procedure    Patient location during procedure: OB  Start Time: 4/9/2024 11:14 PM  Stop Time: 4/10/2024 11:34 PM  Performed By  Anesthesiologist: Juni Elder MD  Preanesthetic Checklist  Completed: patient identified, IV checked, site marked, risks and benefits discussed, surgical consent, monitors and equipment checked, pre-op evaluation and timeout performed  Additional Notes  DPE  Prep:  Pt Position:sitting  Sterile Tech:cap, gloves, mask and sterile barrier  Prep:povidone-iodine 7.5% surgical scrub  Monitoring:blood pressure monitoring  Epidural Block Procedure:  Approach:midline  Guidance:landmark technique and palpation technique  Location:L2-L3  Needle Type:Tuohy  Needle Gauge:17 G  Loss of Resistance Medium: air  Loss of Resistance: 5cm  Cath Depth at skin:15 cm  Paresthesia: none  Aspiration:negative  Test Dose:negative  Number of Attempts: 1  Post Assessment:  Dressing:occlusive dressing applied and secured with tape  Pt Tolerance:patient tolerated the procedure well with no apparent complications  Complications:no

## 2024-04-10 NOTE — L&D DELIVERY NOTE
[facility]  Vaginal Delivery Note  04/10/24  08:18 EDT      Delivery     Delivery: Vaginal, Spontaneous     YOB: 2022    Time of Birth: 7:33 AM   38w4d      Anesthesia: Epidural     Delivering clinician:  Odalis Funk CNM               Delivery narrative:  Patient was pushing and developed a fever. Baby became tachycardic but remained with good variability.  2 gm's IV Ampicillin given along with 1000 mg Tylenol    Patient at 38w5d pushed to deliver a viable male infant over a 1st degree vaginal floor laceration with epidural anesthesia. Infant's head, anterior shoulder, and body delivered atraumatically. infant was placed on mom's abdomen but not responding to stimulation so cord cut and clamped x 2 and infant taken to warmer in care of DRT team. Placenta delivered spontaneously and appeared to be intact. Pitocin to IV after delivery of baby. Laceration repaired with 3-0 Vicryl.  . Mother and infant stable, bonding      Infant    Findings: male  infant     Infant observations: Weight: 3625 g (7 lb 15.9 oz)   Length: 20  in  Observations/Comments:        Apgars: 5  @ 1 minute /    8  @ 5 minutes         Placenta, Cord, and Fluid    Placenta delivered  Spontaneous  at  4/10/2024  7:44 AM     Cord: 3 vessels  present.   Nuchal Cord?  no   Cord blood obtained: Yes    Cord gases obtained:  Yes              Repair    Episiotomy: No       Estimated Blood Loss:  150 mls.   Suture used for repair: 3-0 vicryl     Complications  maternal temperature    Disposition  Mother to Mother Baby/Postpartum  in stable condition currently.  Baby to NICU  in stable condition currently.      Odalis Funk CNM  04/10/24  08:18 EDT

## 2024-04-10 NOTE — LACTATION NOTE
04/10/24 1230   Maternal Information   Date of Referral 04/10/24   Person Making Referral lactation consultant;physician  (new mother/baby couplet)   Maternal Reason for Referral no prior breastfeeding experience   Infant Reason for Referral NICU admission   Maternal Assessment   Breast Size Issue none   Breast Shape Bilateral:;round   Breast Density Bilateral:;soft   Nipples Bilateral:;everted;short   Left Nipple Symptoms intact;nontender   Right Nipple Symptoms intact;nontender   Maternal Infant Feeding   Maternal Emotional State receptive;relaxed;other (see comments)  (Parents are both exhausted from getting no sleep last night.)   Support Person Involvement verbally supports mother   Milk Expression/Equipment   Breast Pump Type double electric, personal;double electric, hospital grade;other (see comments)  (Mom has a [employer] home pump and an [employer] hands-free pump.  A hospital pump was set up for her use in the hospital.)   Breast Pump Flange Size 21 mm   Breast Pumping   Breast Pumping Interventions early pumping promoted;frequent pumping encouraged  (Pumping was initiated, and Mom was encouraged to pump every 3 hours.)     Pumping was initiated because baby was admitted to the NICU.  Mom was advised to pump every 3 hours.  She was provided education on pumping, pump cleaning, and safe milk handling. She was encouraged to call for assistance, if needed.

## 2024-04-10 NOTE — PROGRESS NOTES
"04/10/24  01:35 EDT  Chiara Tobiasquez      ASSESSMENTS:   comfortable now with epidural,did have some late decles after epidural when BP dropped but resolved with ephedrine    BP 97/53   Pulse 114   Temp 98.5 °F (36.9 °C) (Oral)   Resp 16   Ht 162.6 cm (64\")   Wt 103 kg (228 lb)   SpO2 98%   Breastfeeding No   BMI 39.14 kg/m²     Fetal Heart Rate Assessment   Method: Fetal HR Assessment Method: external   Beats/min: Fetal HR (beats/min): 125   Baseline: Fetal HR Baseline: normal range   Varibility: Fetal HR Variability: moderate (amplitude range 6 to 25 bpm)   Accels: Fetal HR Accelerations: greater than/equal to 15 bpm, lasting at least 15 seconds   Decels: Fetal HR Decelerations: absent   Tracing Category:       Uterine Assessment   Method: Method: external tocotransducer   Frequency (min): Contraction Frequency (Minutes): poor tracing   Ctx Count in 10 min:     Duration:     Intensity: Contraction Intensity: moderate by palpation   Intensity by IUPC:     Resting Tone: Uterine Resting Tone: soft by palpation   Resting Tone by IUPC:     [place] Units:                                Presentation: vetex   Cervix:    Dilation: 9   Effacement: 100   Station: -1            Lab Results   Component Value Date    WBC 15.00 (H) 04/09/2024    HGB 12.8 04/09/2024    HCT 37.2 04/09/2024    MCV 86.1 04/09/2024     04/09/2024    CREATININE 0.67 04/09/2024    URICACID 5.0 04/09/2024    AST 23 04/09/2024    ALT 7 04/09/2024     04/09/2024     Results from last 7 days   Lab Units 04/09/24  1126   ABO TYPING  O   RH TYPING  Positive   ANTIBODY SCREEN  Negative       PLAN:  Allow patient to continue to progress and then labor down until she feels pressure    Odalis Funk CNM  01:35 EDT  04/10/24  " O-L Flap Text: The defect edges were debeveled with a #15 scalpel blade. Given the location of the defect, shape of the defect and the proximity to free margins an O-L flap was deemed most appropriate. Using a sterile surgical marker, an appropriate advancement flap was drawn incorporating the defect and placing the expected incisions within the relaxed skin tension lines where possible. The area thus outlined was incised deep to adipose tissue with a #15 scalpel blade. The skin margins were undermined to an appropriate distance in all directions utilizing iris scissors. Following this, the designed flap was advanced and carried over into the primary defect and sutured into place.

## 2024-04-10 NOTE — PROGRESS NOTES
04/09/24  20:36 EDT  Chiara Polk      ASSESSMENTS:  Still doing well, feels a little pressure but not much, requested vaginal exam and was still about 7, states the contractions are getting stronger    /70 (BP Location: Left arm, Patient Position: Sitting)   Pulse 90   Temp 97.9 °F (36.6 °C) (Oral)   Resp 16   Breastfeeding No     Fetal Heart Rate Assessment   Method: Fetal HR Assessment Method: external   Beats/min: Fetal HR (beats/min): 125   Baseline: Fetal HR Baseline: normal range   Varibility: Fetal HR Variability: moderate (amplitude range 6 to 25 bpm)   Accels: Fetal HR Accelerations: greater than/equal to 15 bpm, lasting at least 15 seconds   Decels: Fetal HR Decelerations: absent   Tracing Category:       Uterine Assessment   Method: Method: external tocotransducer   Frequency (min): Contraction Frequency (Minutes): poor tracing   Ctx Count in 10 min:     Duration:     Intensity: Contraction Intensity: moderate by palpation   Intensity by IUPC:     Resting Tone: Uterine Resting Tone: soft by palpation   Resting Tone by IUPC:     [place] Units:                                Presentation: vertex   Cervix: Exam by: Method: sterile exam per physician   Dilation: Cervical Dilation (cm): 7   Effacement: Cervical Effacement: 90%   Station: Fetal Station: -1            Lab Results   Component Value Date    WBC 15.00 (H) 04/09/2024    HGB 12.8 04/09/2024    HCT 37.2 04/09/2024    MCV 86.1 04/09/2024     04/09/2024    CREATININE 0.67 04/09/2024    URICACID 5.0 04/09/2024    AST 23 04/09/2024    ALT 7 04/09/2024     04/09/2024     Results from last 7 days   Lab Units 04/09/24  1126   ABO TYPING  O   RH TYPING  Positive   ANTIBODY SCREEN  Negative       PLAN:  Continue to watch and manage expectantly    Odalis Funk CNM  20:36 EDT  04/09/24

## 2024-04-10 NOTE — PLAN OF CARE
Problem: Adult Inpatient Plan of Care  Goal: Plan of Care Review  Outcome: Ongoing, Progressing  Goal: Patient-Specific Goal (Individualized)  Outcome: Ongoing, Progressing  Goal: Absence of Hospital-Acquired Illness or Injury  Outcome: Ongoing, Progressing  Intervention: Identify and Manage Fall Risk  Recent Flowsheet Documentation  Taken 4/10/2024 0445 by Lien Martínez RN  Safety Promotion/Fall Prevention:   assistive device/personal items within reach   clutter free environment maintained   room organization consistent   safety round/check completed  Taken 4/10/2024 0332 by Lien Martínez RN  Safety Promotion/Fall Prevention:   assistive device/personal items within reach   clutter free environment maintained   room organization consistent   safety round/check completed  Taken 4/10/2024 0242 by Lien Martínez RN  Safety Promotion/Fall Prevention:   assistive device/personal items within reach   clutter free environment maintained   room organization consistent   safety round/check completed  Taken 4/10/2024 0125 by Lien Martínez RN  Safety Promotion/Fall Prevention:   assistive device/personal items within reach   clutter free environment maintained   room organization consistent   safety round/check completed  Taken 4/10/2024 0020 by Lien Martínez RN  Safety Promotion/Fall Prevention:   assistive device/personal items within reach   clutter free environment maintained   room organization consistent   safety round/check completed  Taken 4/9/2024 2252 by Lien Martínez RN  Safety Promotion/Fall Prevention:   assistive device/personal items within reach   clutter free environment maintained   nonskid shoes/slippers when out of bed   room organization consistent   safety round/check completed  Taken 4/9/2024 1923 by Lien Martínez RN  Safety Promotion/Fall Prevention:   assistive device/personal items within reach   clutter free environment maintained   room organization consistent   safety round/check  completed  Intervention: Prevent and Manage VTE (Venous Thromboembolism) Risk  Recent Flowsheet Documentation  Taken 4/10/2024 0020 by Lien Martínez RN  Activity Management: (d/t epidural) bedrest  Taken 4/9/2024 1923 by Lien Martínez RN  Activity Management: up ad ab  Range of Motion: active ROM (range of motion) encouraged  Intervention: Prevent Infection  Recent Flowsheet Documentation  Taken 4/10/2024 0445 by Lien Martínez RN  Infection Prevention:   hand hygiene promoted   rest/sleep promoted  Taken 4/10/2024 0332 by Lien Martínez RN  Infection Prevention:   hand hygiene promoted   rest/sleep promoted  Taken 4/10/2024 0242 by Lien Martínez RN  Infection Prevention:   hand hygiene promoted   rest/sleep promoted  Taken 4/10/2024 0125 by Lien Martínez RN  Infection Prevention:   hand hygiene promoted   rest/sleep promoted  Taken 4/10/2024 0020 by Lien Martínez RN  Infection Prevention:   hand hygiene promoted   rest/sleep promoted  Taken 4/9/2024 2252 by Lien Martínez RN  Infection Prevention:   hand hygiene promoted   rest/sleep promoted  Taken 4/9/2024 1923 by Lien Martínez RN  Infection Prevention:   hand hygiene promoted   rest/sleep promoted  Goal: Optimal Comfort and Wellbeing  Outcome: Ongoing, Progressing  Intervention: Provide Person-Centered Care  Recent Flowsheet Documentation  Taken 4/10/2024 0445 by Lien Martínez RN  Trust Relationship/Rapport:   care explained   choices provided   emotional support provided   empathic listening provided   questions answered   questions encouraged   reassurance provided   thoughts/feelings acknowledged  Taken 4/10/2024 0332 by Lien Martínez RN  Trust Relationship/Rapport:   care explained   choices provided   emotional support provided   empathic listening provided   questions answered   questions encouraged   reassurance provided   thoughts/feelings acknowledged  Taken 4/10/2024 0242 by Lien Martínez RN  Trust Relationship/Rapport:   care  explained   choices provided   emotional support provided   empathic listening provided   questions answered   questions encouraged   reassurance provided   thoughts/feelings acknowledged  Taken 4/10/2024 0125 by Lien Martínez RN  Trust Relationship/Rapport:   care explained   choices provided   emotional support provided   empathic listening provided   questions answered   questions encouraged   reassurance provided   thoughts/feelings acknowledged  Taken 4/10/2024 0020 by Lien Martínez RN  Trust Relationship/Rapport:   care explained   choices provided   emotional support provided   empathic listening provided   questions answered   questions encouraged   reassurance provided   thoughts/feelings acknowledged  Taken 4/9/2024 2252 by Lien Martínez RN  Trust Relationship/Rapport:   care explained   choices provided   emotional support provided   empathic listening provided   questions answered   questions encouraged   reassurance provided   thoughts/feelings acknowledged  Taken 4/9/2024 1923 by Lien Martínez RN  Trust Relationship/Rapport:   care explained   choices provided   emotional support provided   empathic listening provided   questions answered   questions encouraged   reassurance provided   thoughts/feelings acknowledged  Goal: Readiness for Transition of Care  Outcome: Ongoing, Progressing     Problem: Bleeding (Labor)  Goal: Hemostasis  Outcome: Ongoing, Progressing     Problem: Change in Fetal Wellbeing (Labor)  Goal: Stable Fetal Wellbeing  Outcome: Ongoing, Progressing     Problem: Delayed Labor Progression (Labor)  Goal: Effective Progression to Delivery  Outcome: Ongoing, Progressing     Problem: Infection (Labor)  Goal: Absence of Infection Signs and Symptoms  Outcome: Ongoing, Progressing  Intervention: Prevent or Manage Infection  Recent Flowsheet Documentation  Taken 4/10/2024 0445 by iLen Martínez RN  Infection Prevention:   hand hygiene promoted   rest/sleep promoted  Taken 4/10/2024  0332 by Lien Martínez RN  Infection Prevention:   hand hygiene promoted   rest/sleep promoted  Taken 4/10/2024 0242 by Lien Martínez RN  Infection Prevention:   hand hygiene promoted   rest/sleep promoted  Taken 4/10/2024 0125 by Lien Martínez RN  Infection Prevention:   hand hygiene promoted   rest/sleep promoted  Taken 4/10/2024 0020 by Lien Martínez RN  Infection Prevention:   hand hygiene promoted   rest/sleep promoted  Taken 4/9/2024 2252 by Lien Martínez RN  Infection Prevention:   hand hygiene promoted   rest/sleep promoted  Taken 4/9/2024 1923 by Lien Martínez RN  Infection Prevention:   hand hygiene promoted   rest/sleep promoted     Problem: Labor Pain (Labor)  Goal: Acceptable Pain Control  Outcome: Ongoing, Progressing     Problem: Uterine Tachysystole (Labor)  Goal: Normal Uterine Contraction Pattern  Outcome: Ongoing, Progressing   Goal Outcome Evaluation:

## 2024-04-11 LAB
BASOPHILS # BLD AUTO: 0.04 10*3/MM3 (ref 0–0.2)
BASOPHILS NFR BLD AUTO: 0.3 % (ref 0–1.5)
DEPRECATED RDW RBC AUTO: 45.7 FL (ref 37–54)
EOSINOPHIL # BLD AUTO: 0.18 10*3/MM3 (ref 0–0.4)
EOSINOPHIL NFR BLD AUTO: 1.3 % (ref 0.3–6.2)
ERYTHROCYTE [DISTWIDTH] IN BLOOD BY AUTOMATED COUNT: 13.7 % (ref 12.3–15.4)
HCT VFR BLD AUTO: 31.4 % (ref 34–46.6)
HGB BLD-MCNC: 10.4 G/DL (ref 12–15.9)
IMM GRANULOCYTES # BLD AUTO: 0.1 10*3/MM3 (ref 0–0.05)
IMM GRANULOCYTES NFR BLD AUTO: 0.7 % (ref 0–0.5)
LYMPHOCYTES # BLD AUTO: 2.99 10*3/MM3 (ref 0.7–3.1)
LYMPHOCYTES NFR BLD AUTO: 20.9 % (ref 19.6–45.3)
MCH RBC QN AUTO: 30 PG (ref 26.6–33)
MCHC RBC AUTO-ENTMCNC: 33.1 G/DL (ref 31.5–35.7)
MCV RBC AUTO: 90.5 FL (ref 79–97)
MONOCYTES # BLD AUTO: 1.02 10*3/MM3 (ref 0.1–0.9)
MONOCYTES NFR BLD AUTO: 7.1 % (ref 5–12)
NEUTROPHILS NFR BLD AUTO: 10.01 10*3/MM3 (ref 1.7–7)
NEUTROPHILS NFR BLD AUTO: 69.7 % (ref 42.7–76)
NRBC BLD AUTO-RTO: 0 /100 WBC (ref 0–0.2)
PLATELET # BLD AUTO: 227 10*3/MM3 (ref 140–450)
PMV BLD AUTO: 11.7 FL (ref 6–12)
RBC # BLD AUTO: 3.47 10*6/MM3 (ref 3.77–5.28)
WBC NRBC COR # BLD AUTO: 14.34 10*3/MM3 (ref 3.4–10.8)

## 2024-04-11 PROCEDURE — 25010000002 GENTAMICIN PER 80 MG

## 2024-04-11 PROCEDURE — 85025 COMPLETE CBC W/AUTO DIFF WBC: CPT | Performed by: ADVANCED PRACTICE MIDWIFE

## 2024-04-11 RX ADMIN — ACETAMINOPHEN 650 MG: 325 TABLET ORAL at 10:24

## 2024-04-11 RX ADMIN — GENTAMICIN SULFATE 370 MG: 40 INJECTION, SOLUTION INTRAMUSCULAR; INTRAVENOUS at 10:25

## 2024-04-11 RX ADMIN — DOCUSATE SODIUM 100 MG: 100 CAPSULE, LIQUID FILLED ORAL at 10:25

## 2024-04-11 RX ADMIN — IBUPROFEN 600 MG: 600 TABLET, FILM COATED ORAL at 19:19

## 2024-04-11 RX ADMIN — ACETAMINOPHEN 650 MG: 325 TABLET ORAL at 23:12

## 2024-04-11 RX ADMIN — METFORMIN HYDROCHLORIDE 500 MG: 500 TABLET, FILM COATED ORAL at 20:56

## 2024-04-11 RX ADMIN — Medication 2 SPRAY: at 20:56

## 2024-04-11 RX ADMIN — ACETAMINOPHEN 650 MG: 325 TABLET ORAL at 16:22

## 2024-04-11 RX ADMIN — Medication 2 SPRAY: at 10:31

## 2024-04-11 RX ADMIN — DOCUSATE SODIUM 100 MG: 100 CAPSULE, LIQUID FILLED ORAL at 20:56

## 2024-04-11 RX ADMIN — IBUPROFEN 600 MG: 600 TABLET, FILM COATED ORAL at 06:07

## 2024-04-11 RX ADMIN — HYDROCORTISONE 2.5% 1 APPLICATION: 25 CREAM TOPICAL at 16:20

## 2024-04-11 RX ADMIN — IBUPROFEN 600 MG: 600 TABLET, FILM COATED ORAL at 12:14

## 2024-04-11 RX ADMIN — Medication 10 ML: at 06:08

## 2024-04-11 NOTE — ANESTHESIA POSTPROCEDURE EVALUATION
Patient: Chiara Polk    Procedure Summary       Date: 04/09/24 Room / Location:     Anesthesia Start: 2314 Anesthesia Stop: 04/10/24 0744    Procedure: LABOR ANALGESIA Diagnosis:     Scheduled Providers:  Provider: Juni Elder MD    Anesthesia Type: epidural ASA Status: 2 - Emergent            Anesthesia Type: epidural    Vitals  Vitals Value Taken Time   /63 04/11/24 0734   Temp 97.4 °F (36.3 °C) 04/11/24 0734   Pulse 61 04/11/24 0734   Resp 16 04/11/24 0734   SpO2 97 % 04/10/24 0407           Post Anesthesia Care and Evaluation    Patient location during evaluation: bedside  Patient participation: complete - patient participated  Level of consciousness: awake and alert  Pain management: adequate    Airway patency: patent  Anesthetic complications: No anesthetic complications    Cardiovascular status: acceptable  Respiratory status: acceptable  Hydration status: acceptable  Post Neuraxial Block status: Motor and sensory function returned to baseline and No signs or symptoms of PDPH

## 2024-04-11 NOTE — LACTATION NOTE
Courtesy revisit with NICU mother.  Patient is currently asleep in bed.  Lactation will check in later today.

## 2024-04-11 NOTE — PLAN OF CARE
Goal Outcome Evaluation:  Plan of Care Reviewed With: patient        Progress: improving  Outcome Evaluation: VSS, lochia/fundus WDL, pain well controlled with motrin, patient visits infant in NICU.

## 2024-04-11 NOTE — PROGRESS NOTES
4/11/2024  PPD #1    Subjective   Chiara feels well.  Patient describes her lochia as less than menses.  Pain is well controlled  Her baby is being intubated in NICU today--she is coping as expected.        Objective   Temp: Temp:  [97.4 °F (36.3 °C)-98.3 °F (36.8 °C)] 97.4 °F (36.3 °C) Temp src: Oral   BP: BP: (108-132)/(63-73) 108/63        Pulse: Heart Rate:  [61-98] 61  RR: Resp:  [16-18] 16    General:  No acute distress   Abdomen: Fundus firm and beneath umbilicus   Pelvis: deferred     Lab Results   Component Value Date    WBC 14.34 (H) 04/11/2024    HGB 10.4 (L) 04/11/2024    HCT 31.4 (L) 04/11/2024    MCV 90.5 04/11/2024     04/11/2024    HEPBSAG Negative 01/10/2024    AST 23 04/09/2024    ALT 7 04/09/2024    URICACID 5.0 04/09/2024       Assessment  PPD# 1 after vaginal delivery, doing well  Chorioamnionitis: s/p amp and gent x 1    Plan  Supportive care, anticipate discharge in am.      This note has been electronically signed.    Justa Hernandez MD  12:17 EDT  April 11, 2024

## 2024-04-11 NOTE — LACTATION NOTE
Revisit with NICU mother.  Patient states that she recently pumped 5ml and has no questions/concerns at this time.  Reassured her that is a good amount for 1 day postpartum.  All questions/concerns answered at this time.

## 2024-04-12 VITALS
HEIGHT: 64 IN | WEIGHT: 228 LBS | DIASTOLIC BLOOD PRESSURE: 80 MMHG | RESPIRATION RATE: 16 BRPM | HEART RATE: 81 BPM | BODY MASS INDEX: 38.93 KG/M2 | SYSTOLIC BLOOD PRESSURE: 126 MMHG | TEMPERATURE: 97.8 F | OXYGEN SATURATION: 97 %

## 2024-04-12 RX ORDER — PSEUDOEPHEDRINE HCL 30 MG
100 TABLET ORAL 2 TIMES DAILY PRN
Qty: 30 CAPSULE | Refills: 1 | Status: SHIPPED | OUTPATIENT
Start: 2024-04-12

## 2024-04-12 RX ORDER — IBUPROFEN 600 MG/1
600 TABLET ORAL EVERY 6 HOURS PRN
Qty: 20 TABLET | Refills: 0 | Status: SHIPPED | OUTPATIENT
Start: 2024-04-12

## 2024-04-12 RX ADMIN — Medication 2 SPRAY: at 08:52

## 2024-04-12 RX ADMIN — DOCUSATE SODIUM 100 MG: 100 CAPSULE, LIQUID FILLED ORAL at 08:50

## 2024-04-12 RX ADMIN — IBUPROFEN 600 MG: 600 TABLET, FILM COATED ORAL at 08:50

## 2024-04-12 NOTE — LACTATION NOTE
Courtesy visit, patient out of the room, visiting infant in the NICU.  Lactation to stop by later today.

## 2024-04-12 NOTE — PROGRESS NOTES
4/12/2024  PPD #2    Subjective   Chiara feels well.  Patient describes her lochia as less than menses.  Pain is well controlled  Baby is stable in NICU--intubated       Objective   Temp: Temp:  [97.5 °F (36.4 °C)-97.9 °F (36.6 °C)] 97.8 °F (36.6 °C) Temp src: Oral   BP: BP: (123-126)/(71-84) 126/80        Pulse: Heart Rate:  [72-91] 81  RR: Resp:  [16-18] 16    General:  No acute distress   Abdomen: Fundus firm and beneath umbilicus   Pelvis: deferred     Lab Results   Component Value Date    WBC 14.34 (H) 04/11/2024    HGB 10.4 (L) 04/11/2024    HCT 31.4 (L) 04/11/2024    MCV 90.5 04/11/2024     04/11/2024    HEPBSAG Negative 01/10/2024    AST 23 04/09/2024    ALT 7 04/09/2024    URICACID 5.0 04/09/2024       Assessment  PPD# 2 after vaginal delivery, doing well  Chorioamnionitis: s/p amp and gent x 1.  Afebrile.    Plan  Discharge to home  Follow up with LW  in 6 weeks  Advised no tampons, intercourse, or tub baths for 6 weeks.       This note has been electronically signed.    Justa Hernandez MD  09:09 EDT  April 12, 2024

## 2024-04-12 NOTE — DISCHARGE SUMMARY
[facility]  Vaginal Delivery Discharge Summary      Patient: Chiara Polk      MR#:[id number]  Admission  Diagnosis: term pregnancy  Discharge Diagnosis: Same    Date of Admission: 4/9/2024  Date of Discharge:  4/12/2024    Procedures:  Vaginal, Spontaneous     4/10/2024    7:33 AM      Service:  Obstetrics    Hospital Course:  Patient underwent vaginal delivery and remained in the hospital for 3 days.  During that time she remained afebrile and hemodynamically stable.  On the day of discharge, she was eating, ambulating and voiding without difficulty.      Lab Results   Component Value Date    WBC 14.34 (H) 04/11/2024    HGB 10.4 (L) 04/11/2024    HCT 31.4 (L) 04/11/2024    MCV 90.5 04/11/2024     04/11/2024    CREATININE 0.67 04/09/2024    URICACID 5.0 04/09/2024    AST 23 04/09/2024    ALT 7 04/09/2024     04/09/2024     Results from last 7 days   Lab Units 04/09/24  1126   ABO TYPING  O   RH TYPING  Positive   ANTIBODY SCREEN  Negative       Discharge Medications     Discharge Medications        New Medications        Instructions Start Date   docusate sodium 100 MG capsule   100 mg, Oral, 2 Times Daily PRN      ibuprofen 600 MG tablet  Commonly known as: ADVIL,MOTRIN   600 mg, Oral, Every 6 Hours PRN             Continue These Medications        Instructions Start Date   metFORMIN 500 MG tablet  Commonly known as: GLUCOPHAGE   750 mg, Oral, Every Night at Bedtime      Prenatal 27-1 27-1 MG tablet tablet   Oral, Daily      Risaquad-2 capsule capsule   1 capsule, Oral, Daily               Discharge Disposition:  To Home    Discharge Condition:  Stable    Discharge Diet: regular    Activity at Discharge: Pelvic rest    Follow-up Appointments  6 weeks      Justa Hernandez MD  04/12/24  09:11 EDT

## 2024-04-12 NOTE — LACTATION NOTE
Courtesy revisit with NICU mother.  Patient last pumped 25 ml.  Encouraged her this is a great number for 2 days PP.  Discussed switching to the pushing power on the hospital pump once she pumps greater than 20 ml for 3 pumps in a row.  All questions/concerns answered at this time.  Encouraged an outpatient lactation clinic appointment after infant discharge from the NICU.

## 2024-04-12 NOTE — PLAN OF CARE
Goal Outcome Evaluation:  Plan of Care Reviewed With: patient, spouse        Progress: improving  Outcome Evaluation: VSS, lochia/fundus WDL, pain well controlled, visits infant in NICU, anticipated discharge today.

## 2024-04-12 NOTE — CASE MANAGEMENT/SOCIAL WORK
Continued Stay Note  [facility]     Patient Name: Chiara Polk  MRN: [id number]  Today's Date: 4/12/2024    Admit Date: 4/9/2024    Plan: MSW available   Discharge Plan       Row Name 04/12/24 1438       Plan    Plan MSW available    Plan Comments Visited pt's mother and fob. Discussed stressors of NICU and offered support. Mother declined info on [employer]. Denies current needs.    Final Discharge Disposition Code 01 - home or self-care                   Discharge Codes    No documentation.                 Expected Discharge Date and Time       Expected Discharge Date Expected Discharge Time    Apr 12, 2024               TRACIE Baeza

## 2024-04-22 ENCOUNTER — MATERNAL SCREENING (OUTPATIENT)
Dept: CALL CENTER | Facility: HOSPITAL | Age: 29
End: 2024-04-22
Payer: COMMERCIAL

## 2024-04-22 NOTE — OUTREACH NOTE
Maternal Screening Survey      Flowsheet Row Responses   Facility patient discharged from? [place]   Attempt successful? Yes   Call start time 144   Call end time 1447   Person spoke with today (if not patient) and relationship patient   EPD Scale: Able to Laugh 0-->as much as she always could   EPD Scale: Looked Forward 0-->as much as she ever did   EPD Scale: Blamed Self 0-->no, never   EPD Scale: Been Anxious 0-->no, not at all   EPD Scale: Felt Panicky 0-->no, not at all   EPD Scale: Things Getting on Top 0-->no, has been coping as well as ever   EPD Scale: Difficulty Sleeping 0-->no, not at all   EPD Scale: Sad or Miserable 0-->no, not at all   EPD Scale: Crying 0-->no, never   EPD Scale: Thought of Harming Self 0-->never   [place]  Depression Score 0   Did any of your parents have problems with alcohol or drug use? No   Do any of your peers have problems with alcohol or drug use? No   Does your partner have problems with alcohol or drug use? No   Before you were pregnant did you have problems with alcohol or drug use? (past) No   In the past month, did you drink beer, wine, liquor or use any other drugs? (pregnancy) No   Maternal Screening call completed Yes   Wrap up additional comments Baby in NICU   Call end time 1447              Steven ELLIOTT - Registered Nurse                Steven ELLIOTT - Registered Nurse